# Patient Record
Sex: FEMALE | Race: WHITE | NOT HISPANIC OR LATINO | URBAN - METROPOLITAN AREA
[De-identification: names, ages, dates, MRNs, and addresses within clinical notes are randomized per-mention and may not be internally consistent; named-entity substitution may affect disease eponyms.]

---

## 2024-05-20 NOTE — ASU PATIENT PROFILE, ADULT - FALL HARM RISK - UNIVERSAL INTERVENTIONS
Bed in lowest position, wheels locked, appropriate side rails in place/Call bell, personal items and telephone in reach/Instruct patient to call for assistance before getting out of bed or chair/Non-slip footwear when patient is out of bed/[place] to call system/Physically safe environment - no spills, clutter or unnecessary equipment/Purposeful Proactive Rounding/Room/bathroom lighting operational, light cord in reach

## 2024-05-21 ENCOUNTER — OUTPATIENT (OUTPATIENT)
Dept: OUTPATIENT SERVICES | Facility: HOSPITAL | Age: 44
LOS: 1 days | Discharge: HOME | End: 2024-05-21
Payer: COMMERCIAL

## 2024-05-21 VITALS
RESPIRATION RATE: 18 BRPM | OXYGEN SATURATION: 99 % | SYSTOLIC BLOOD PRESSURE: 115 MMHG | DIASTOLIC BLOOD PRESSURE: 67 MMHG | HEART RATE: 77 BPM

## 2024-05-21 VITALS
WEIGHT: 115.08 LBS | SYSTOLIC BLOOD PRESSURE: 104 MMHG | DIASTOLIC BLOOD PRESSURE: 71 MMHG | RESPIRATION RATE: 16 BRPM | HEIGHT: 62 IN | HEART RATE: 65 BPM | TEMPERATURE: 98 F | OXYGEN SATURATION: 100 %

## 2024-05-21 DIAGNOSIS — Z98.890 OTHER SPECIFIED POSTPROCEDURAL STATES: Chronic | ICD-10-CM

## 2024-05-21 DIAGNOSIS — L91.0 HYPERTROPHIC SCAR: ICD-10-CM

## 2024-05-21 LAB — HCG UR QL: NEGATIVE — SIGNIFICANT CHANGE UP

## 2024-05-21 PROCEDURE — 88304 TISSUE EXAM BY PATHOLOGIST: CPT | Mod: 26

## 2024-05-21 DEVICE — IMPLANTABLE DEVICE: Type: IMPLANTABLE DEVICE | Status: FUNCTIONAL

## 2024-05-21 RX ORDER — BENZOCAINE AND MENTHOL 5; 1 G/100ML; G/100ML
1 LIQUID ORAL ONCE
Refills: 0 | Status: COMPLETED | OUTPATIENT
Start: 2024-05-21 | End: 2024-05-21

## 2024-05-21 RX ORDER — OXYCODONE HYDROCHLORIDE 5 MG/1
5 TABLET ORAL ONCE
Refills: 0 | Status: DISCONTINUED | OUTPATIENT
Start: 2024-05-21 | End: 2024-05-21

## 2024-05-21 RX ORDER — ONDANSETRON 8 MG/1
4 TABLET, FILM COATED ORAL ONCE
Refills: 0 | Status: DISCONTINUED | OUTPATIENT
Start: 2024-05-21 | End: 2024-06-04

## 2024-05-21 RX ORDER — ACETAMINOPHEN 500 MG
1000 TABLET ORAL ONCE
Refills: 0 | Status: COMPLETED | OUTPATIENT
Start: 2024-05-21 | End: 2024-05-21

## 2024-05-21 RX ORDER — FENTANYL CITRATE 50 UG/ML
25 INJECTION INTRAVENOUS
Refills: 0 | Status: DISCONTINUED | OUTPATIENT
Start: 2024-05-21 | End: 2024-05-21

## 2024-05-21 RX ADMIN — FENTANYL CITRATE 25 MICROGRAM(S): 50 INJECTION INTRAVENOUS at 14:15

## 2024-05-21 RX ADMIN — OXYCODONE HYDROCHLORIDE 5 MILLIGRAM(S): 5 TABLET ORAL at 14:35

## 2024-05-21 RX ADMIN — FENTANYL CITRATE 25 MICROGRAM(S): 50 INJECTION INTRAVENOUS at 13:48

## 2024-05-21 RX ADMIN — Medication 400 MILLIGRAM(S): at 13:50

## 2024-05-21 RX ADMIN — OXYCODONE HYDROCHLORIDE 5 MILLIGRAM(S): 5 TABLET ORAL at 15:05

## 2024-05-21 RX ADMIN — BENZOCAINE AND MENTHOL 1 LOZENGE: 5; 1 LIQUID ORAL at 14:53

## 2024-05-21 RX ADMIN — Medication 1000 MILLIGRAM(S): at 14:15

## 2024-05-21 NOTE — ASU PREOP CHECKLIST - VIA
Lazy S Complex Repair Preamble Text (Leave Blank If You Do Not Want): Extensive wide undermining was performed. ambulate

## 2024-05-21 NOTE — ASU DISCHARGE PLAN (ADULT/PEDIATRIC) - ASU DC SPECIAL INSTRUCTIONSFT
No heavy lifting/pushing/pulling/reaching overhead/strenuous activity.   DO NOT lift arms above 45 degrees, keep elbows in and close to sides.   Sponge bath only for the first 24 hours after surgery.   Sleep with head of bed elevated to help minimize swelling.   Take all medications sent from our office as prescribed.   Keep all dressings and garments in place until follow up in office.

## 2024-05-21 NOTE — H&P ADULT - NSHPPHYSICALEXAM_GEN_ALL_CORE
T(C): 36.4 (05-21-24 @ 10:12), Max: 36.4 (05-21-24 @ 10:12)  HR: 65 (05-21-24 @ 10:12) (65 - 65)  BP: 104/71 (05-21-24 @ 10:12) (104/71 - 104/71)  RR: 16 (05-21-24 @ 10:12) (16 - 16)  SpO2: 100% (05-21-24 @ 10:12) (100% - 100%)    CONSTITUTIONAL: Well groomed, no apparent distress  EYES: PERRLA and symmetric  RESP: No respiratory distress, no use of accessory muscles; CTA b/l  GI: Soft, NT, ND, no hernia.   MSK: Normal gait; no calf tendernss.   SKIN: No rashes or ulcers noted; no subcutaneous nodules or induration palpable  PSYCH: Appropriate insight/judgment; A+O x 3, mood and affect appropriate, recent/remote memory intact

## 2024-05-21 NOTE — H&P ADULT - HISTORY OF PRESENT ILLNESS
42 y/o female presents today for bilateral breast scar revision and placement of bilateral breast implants.

## 2024-05-21 NOTE — ASU DISCHARGE PLAN (ADULT/PEDIATRIC) - NURSING INSTRUCTIONS
DO NOT take any Tylenol (Acetaminophen) or narcotics containing Tylenol until after 650p . You received Tylenol during your operation and it can cause damage to your liver if too much is taken within a 24 hour time period.

## 2024-05-21 NOTE — ASU DISCHARGE PLAN (ADULT/PEDIATRIC) - CARE PROVIDER_API CALL
Igor Solares  Plastic Surgery  [address]  [place] [address]  Phone: [phone]  Fax: [phone]  Established Patient  Scheduled Appointment: 05/22/2024 09:00 AM

## 2024-05-21 NOTE — BRIEF OPERATIVE NOTE - NSICDXBRIEFPOSTOP_GEN_ALL_CORE_FT
POST-OP DIAGNOSIS:  Encounter for cosmetic surgery 21-May-2024 13:44:08  Amanda Fair  Scar of female breast 21-May-2024 13:43:42  Amanda Fair

## 2024-05-21 NOTE — H&P ADULT - ASSESSMENT
A/P: 44 y/o female preoperative status for planned bilateral breast scar revision and placement of bilateral breat implants.   -NPO, IVF  -Informed consent obtained  -Preoperative protocol initiated    -Post op care to follow

## 2024-05-21 NOTE — ASU DISCHARGE PLAN (ADULT/PEDIATRIC) - NS MD DC FALL RISK RISK
For information on Fall & Injury Prevention, visit: [email or web address] OR  [email or web address] OR  https://www.cdc.gov/steadi/patient.html

## 2024-05-21 NOTE — BRIEF OPERATIVE NOTE - NSICDXBRIEFPREOP_GEN_ALL_CORE_FT
PRE-OP DIAGNOSIS:  Scar of female breast 21-May-2024 13:43:31  Amanda Fair  Encounter for cosmetic surgery 21-May-2024 13:43:57  Amanda Fair

## 2024-05-21 NOTE — ASU DISCHARGE PLAN (ADULT/PEDIATRIC) - PROVIDER TOKENS
PROVIDER:[TOKEN:[[id number]],SCHEDULEDAPPT:[05/22/2024],SCHEDULEDAPPTTIME:[09:00 AM],ESTABLISHEDPATIENT:[T]]

## 2024-05-29 LAB — SURGICAL PATHOLOGY STUDY: SIGNIFICANT CHANGE UP

## 2024-09-07 NOTE — H&P ADULT - NSICDXFAMHXNEG_GEN_ALL
cancer/stroke/VTE
no chills/no decreased eating/drinking/no dizziness/no fever/no nausea/no pain/no tingling/no vomiting

## (undated) DEVICE — GOWN LG

## (undated) DEVICE — SOL IRR POUR H2O 500ML

## (undated) DEVICE — SUT MONOCRYL 2-0 27" SH UNDYED

## (undated) DEVICE — ELCTR BOVIE PENCIL SMOKE EVACUATION

## (undated) DEVICE — DRAPE IOBAN 23" X 23"

## (undated) DEVICE — VENODYNE/SCD SLEEVE CALF MEDIUM

## (undated) DEVICE — DRSG STERISTRIPS 0.5 X 4"

## (undated) DEVICE — ONETRAC LIGHTED RETRACTOR 135 X 30MM DISP

## (undated) DEVICE — MARKING PEN W RULER

## (undated) DEVICE — Device

## (undated) DEVICE — SOL IRR POUR NS 0.9% 500ML

## (undated) DEVICE — PACK MAJOR ABDOMINAL WITH LAP

## (undated) DEVICE — LABELS BLANK W PEN

## (undated) DEVICE — SUT MONOCRYL 3-0 27" PS-2 UNDYED

## (undated) DEVICE — ELCTR BOVIE PENCIL BLADE 10FT

## (undated) DEVICE — ELCTR BOVIE TIP BLADE INSULATED 2.75" EDGE

## (undated) DEVICE — SUT MONOCRYL 4-0 27" PS-2 UNDYED

## (undated) DEVICE — WARMING BLANKET FULL ADULT

## (undated) DEVICE — STAPLER SKIN VISI-STAT 35 WIDE

## (undated) DEVICE — DRSG KERLIX ROLL 4.5"

## (undated) DEVICE — STAPLER SKIN MULTI DIRECTION W35

## (undated) DEVICE — SUT PROLENE 2-0 18" FS

## (undated) DEVICE — GLV 8 PROTEXIS (WHITE)

## (undated) DEVICE — POSITIONER STRAP ARMBOARD VELCRO TS-30

## (undated) DEVICE — DRSG TAPE MICROFOAM 3"

## (undated) DEVICE — DRSG COMBINE 5X9"

## (undated) DEVICE — BASIN SET DOUBLE

## (undated) DEVICE — DRAPE 3/4 SHEET 52X76"

## (undated) DEVICE — DRSG MASTISOL

## (undated) DEVICE — DRAIN BLAKE 15FR BARD CHANNEL

## (undated) DEVICE — BLADE SCALPEL SAFETYLOCK #10

## (undated) DEVICE — ELCTR BOVIE TIP BLADE INSULATED 6.5" EDGE

## (undated) DEVICE — DRAIN RESERVOIR FOR JACKSON PRATT 100CC CARDINAL